# Patient Record
Sex: FEMALE
[De-identification: names, ages, dates, MRNs, and addresses within clinical notes are randomized per-mention and may not be internally consistent; named-entity substitution may affect disease eponyms.]

---

## 2020-01-11 ENCOUNTER — NURSE TRIAGE (OUTPATIENT)
Dept: OTHER | Facility: CLINIC | Age: 55
End: 2020-01-11

## 2020-01-11 NOTE — TELEPHONE ENCOUNTER
Reason for Disposition   [0] Systolic BP  >= 190 OR Diastolic >= 799 AND [8] cardiac or neurologic symptoms (e.g., chest pain, difficulty breathing, unsteady gait, blurred vision)    Protocols used: HIGH BLOOD PRESSURE-ADULT-AH    Pt states she has had controlled BP with meds x 10 years. She takes lisinopril/hctz. She has now not felt good for about 1 week. She had to leave work yesterday. She has taken her BP multiple times today with her cuff at home. The highest was 138/104, most recent 123/92. She states she feels nauseated, has a HA, she's hot, she can't concentrate , has difficulty focusing and fatigued. She feels worse today than yesterday. Caller reports symptoms as documented above. Caller informed of disposition. Care advice as documented.